# Patient Record
Sex: FEMALE | Race: WHITE
[De-identification: names, ages, dates, MRNs, and addresses within clinical notes are randomized per-mention and may not be internally consistent; named-entity substitution may affect disease eponyms.]

---

## 2018-09-27 ENCOUNTER — HOSPITAL ENCOUNTER (OUTPATIENT)
Dept: HOSPITAL 47 - LABWHC1 | Age: 83
Discharge: HOME | End: 2018-09-27
Payer: MEDICARE

## 2018-09-27 DIAGNOSIS — E78.2: Primary | ICD-10-CM

## 2018-09-27 LAB
ALBUMIN SERPL-MCNC: 4 G/DL (ref 3.5–5)
ALP SERPL-CCNC: 87 U/L (ref 38–126)
ALT SERPL-CCNC: 46 U/L (ref 9–52)
ANION GAP SERPL CALC-SCNC: 6 MMOL/L
AST SERPL-CCNC: 49 U/L (ref 14–36)
BUN SERPL-SCNC: 11 MG/DL (ref 7–17)
CALCIUM SPEC-MCNC: 9.2 MG/DL (ref 8.4–10.2)
CHLORIDE SERPL-SCNC: 94 MMOL/L (ref 98–107)
CHOLEST SERPL-MCNC: 197 MG/DL (ref ?–200)
CO2 SERPL-SCNC: 34 MMOL/L (ref 22–30)
GLUCOSE SERPL-MCNC: 152 MG/DL (ref 74–99)
HDLC SERPL-MCNC: 83 MG/DL (ref 40–60)
LDLC SERPL CALC-MCNC: 99 MG/DL (ref 0–99)
POTASSIUM SERPL-SCNC: 4.6 MMOL/L (ref 3.5–5.1)
PROT SERPL-MCNC: 7.3 G/DL (ref 6.3–8.2)
SODIUM SERPL-SCNC: 134 MMOL/L (ref 137–145)
TRIGL SERPL-MCNC: 75 MG/DL (ref ?–150)

## 2018-09-27 PROCEDURE — 80053 COMPREHEN METABOLIC PANEL: CPT

## 2018-09-27 PROCEDURE — 36415 COLL VENOUS BLD VENIPUNCTURE: CPT

## 2018-09-27 PROCEDURE — 80061 LIPID PANEL: CPT

## 2019-06-10 ENCOUNTER — HOSPITAL ENCOUNTER (OUTPATIENT)
Dept: HOSPITAL 47 - LABWHC1 | Age: 84
Discharge: HOME | End: 2019-06-10
Attending: INTERNAL MEDICINE
Payer: MEDICARE

## 2019-06-10 DIAGNOSIS — E78.2: Primary | ICD-10-CM

## 2019-06-10 LAB
ALBUMIN SERPL-MCNC: 4 G/DL (ref 3.8–4.9)
ALBUMIN/GLOB SERPL: 1.74 G/DL (ref 1.6–3.17)
ALP SERPL-CCNC: 76 U/L (ref 41–126)
ALT SERPL-CCNC: 34 U/L (ref 8–44)
ANION GAP SERPL CALC-SCNC: 5.5 MMOL/L (ref 4–12)
AST SERPL-CCNC: 37 U/L (ref 13–35)
BUN SERPL-SCNC: 15 MG/DL (ref 9–27)
BUN/CREAT SERPL: 18.75 RATIO (ref 12–20)
CALCIUM SPEC-MCNC: 9.3 MG/DL (ref 8.7–10.3)
CHLORIDE SERPL-SCNC: 97 MMOL/L (ref 96–109)
CHOLEST SERPL-MCNC: 159 MG/DL (ref 0–200)
CO2 SERPL-SCNC: 31.5 MMOL/L (ref 21.6–31.8)
GLOBULIN SER CALC-MCNC: 2.3 G/DL (ref 1.6–3.3)
GLUCOSE SERPL-MCNC: 130 MG/DL (ref 70–110)
HDLC SERPL-MCNC: 69 MG/DL (ref 40–60)
LDLC SERPL CALC-MCNC: 73.6 MG/DL (ref 0–131)
POTASSIUM SERPL-SCNC: 4.5 MMOL/L (ref 3.5–5.5)
PROT SERPL-MCNC: 6.3 G/DL (ref 6.2–8.2)
SODIUM SERPL-SCNC: 134 MMOL/L (ref 135–145)
TRIGL SERPL-MCNC: 82 MG/DL (ref 0–149)
VLDLC SERPL CALC-MCNC: 16.4 MG/DL (ref 5–40)

## 2019-06-10 PROCEDURE — 80061 LIPID PANEL: CPT

## 2019-06-10 PROCEDURE — 80053 COMPREHEN METABOLIC PANEL: CPT

## 2019-06-10 PROCEDURE — 36415 COLL VENOUS BLD VENIPUNCTURE: CPT

## 2019-11-07 ENCOUNTER — HOSPITAL ENCOUNTER (EMERGENCY)
Dept: HOSPITAL 47 - EC | Age: 84
Discharge: HOME | End: 2019-11-07
Payer: MEDICARE

## 2019-11-07 VITALS
SYSTOLIC BLOOD PRESSURE: 140 MMHG | RESPIRATION RATE: 19 BRPM | TEMPERATURE: 98.8 F | HEART RATE: 59 BPM | DIASTOLIC BLOOD PRESSURE: 78 MMHG
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
## 2019-11-07 DIAGNOSIS — Z04.3: Primary | ICD-10-CM
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
## 2019-11-07 PROCEDURE — 99284 EMERGENCY DEPT VISIT MOD MDM: CPT

## 2019-11-07 NOTE — ED
General Adult HPI





- General


Chief complaint: Fall


Stated complaint: Fall


Time Seen by Provider: 11/07/19 14:50


Source: patient, EMS, RN notes reviewed, old records reviewed


Mode of arrival: EMS


Limitations: no limitations





- History of Present Illness


Initial comments: 





This is an 87-year-old female who comes into the emergency department stating 

she fell because she was walking up until she states she fell backwards on the 

left side and landed on her buttocks.  She denies hitting her head she denies 

any loss of consciousness or being days.  She denies any neck pain.  Patient 

states she does not believe she injured anything from the fall.  Patient denies 

any numbness weakness.  Patient denies any chest pain or back pain.  Patient 

denies any upper extremity pain.  Patient denies hip pain.  Patient denies any 

lower extremity pain.  Patient was able to ambulate in the emergency department 

all problems. 





- Related Data


                                    Allergies











Allergy/AdvReac Type Severity Reaction Status Date / Time


 


No Known Allergies Allergy   Verified 11/07/19 14:54














Review of Systems


ROS Statement: 


Those systems with pertinent positive or pertinent negative responses have been 

documented in the HPI.





ROS Other: All systems not noted in ROS Statement are negative.





Past Medical History


Additional Past Medical History / Comment(s): Rapid heart beat


History of Any Multi-Drug Resistant Organisms: None Reported


Past Surgical History: No Surgical Hx Reported


Past Psychological History: No Psychological Hx Reported


Smoking Status: Never smoker


Past Alcohol Use History: None Reported


Past Drug Use History: None Reported





General Exam





- General Exam Comments


Initial Comments: 





GENERAL:


Patient is well-developed and well-nourished.  Patient is nontoxic and well-

hydrated and is in no acute distress.





ENT:


Neck is soft and supple.  No significant lymphadenopathy is noted.  Oropharynx 

is clear.  Moist mucous membranes.  Neck has full range of motion without 

eliciting any pain. 





EYES:


The sclera were anicteric and conjunctiva were pink and moist.  Extraocular 

movements were intact and pupils were equal round and reactive to light.  

Eyelids were unremarkable.





PULMONARY:


Unlabored respirations.  Good breath sounds bilaterally.  No audible rales 

rhonchi or wheezing was noted.





CARDIOVASCULAR:


There is a regular rate and rhythm without any murmurs gallops or rubs.  





ABDOMEN:


Soft and nontender with normal bowel sounds. 





SKIN:


Skin is clear with no lesions or rashes and otherwise unremarkable.





NEUROLOGIC:


Patient is alert and oriented x3.  Cranial nerves II through XII are grossly 

intact.  Motor and sensory are also intact.  Normal speech, volume and content. 

Symmetrical smile. 





MUSCULOSKELETAL:


Normal extremities with adequate strength and full range of motion.  No lower 

extremity swelling or edema.  No calf tenderness.





LYMPHATICS:


No significant lymphadenopathy is noted





PSYCHIATRIC:


Normal psychiatric evaluation.  


Limitations: no limitations





Course





                                   Vital Signs











  11/07/19





  14:48


 


Temperature 98.8 F


 


Pulse Rate 59 L


 


Respiratory 19





Rate 


 


Blood Pressure 140/78


 


O2 Sat by Pulse 97





Oximetry 














Medical Decision Making





- Medical Decision Making





Patient ambulated around the ER without problems patient denied any pain or 

problems from the fall.  Patient denies any symptoms prior to the fall.  Patient

would like to go home.  I will be discharging the patient home.





Disposition


Clinical Impression: 


 Fall





Disposition: HOME SELF-CARE


Condition: Good


Instructions (If sedation given, give patient instructions):  Fall Prevention 

for Older Adults (ED)


Is patient prescribed a controlled substance at d/c from ED?: No


Referrals: 


Roosevelt Leos DO [Primary Care Provider] - 1-2 days


Time of Disposition: 15:21

## 2021-04-20 ENCOUNTER — HOSPITAL ENCOUNTER (EMERGENCY)
Dept: HOSPITAL 47 - EC | Age: 86
Discharge: HOME | End: 2021-04-20
Payer: MEDICARE

## 2021-04-20 VITALS
DIASTOLIC BLOOD PRESSURE: 88 MMHG | TEMPERATURE: 97.9 F | RESPIRATION RATE: 20 BRPM | HEART RATE: 77 BPM | SYSTOLIC BLOOD PRESSURE: 160 MMHG
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
## 2021-04-20 DIAGNOSIS — S60.511A: ICD-10-CM

## 2021-04-20 DIAGNOSIS — Z79.899: ICD-10-CM

## 2021-04-20 DIAGNOSIS — E11.9: ICD-10-CM

## 2021-04-20 DIAGNOSIS — Z79.84: ICD-10-CM
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
## 2021-04-20 DIAGNOSIS — S60.512A: ICD-10-CM

## 2021-04-20 DIAGNOSIS — E78.5: ICD-10-CM

## 2021-04-20 DIAGNOSIS — S01.511A: Primary | ICD-10-CM

## 2021-04-20 DIAGNOSIS — I10: ICD-10-CM

## 2021-04-20 DIAGNOSIS — I48.91: ICD-10-CM

## 2021-04-20 DIAGNOSIS — Z79.82: ICD-10-CM

## 2021-04-20 PROCEDURE — 70486 CT MAXILLOFACIAL W/O DYE: CPT

## 2021-04-20 PROCEDURE — 72125 CT NECK SPINE W/O DYE: CPT

## 2021-04-20 PROCEDURE — 70450 CT HEAD/BRAIN W/O DYE: CPT

## 2021-04-20 PROCEDURE — 90471 IMMUNIZATION ADMIN: CPT

## 2021-04-20 PROCEDURE — 90715 TDAP VACCINE 7 YRS/> IM: CPT

## 2021-04-20 PROCEDURE — 99283 EMERGENCY DEPT VISIT LOW MDM: CPT

## 2021-04-20 NOTE — ED
General Adult HPI





- General


Chief complaint: Fall


Stated complaint: fall, facial injury


Time Seen by Provider: 04/20/21 12:32


Source: patient


Mode of arrival: wheelchair


Limitations: no limitations





- History of Present Illness


Initial comments: 





Talisha is an 88-year-old female with a past medical history of atrial 

fibrillation on aspirin, diabetes mellitus, hyperlipidemia, hypertension who 

presents to the emergency room for a chief complaint of fall.  Patient states 

that she was walking across a uneven sidewalk after getting her hair done.  She 

states that she tripped and fell.  She hit her face but she did not lose 

consciousness.  She did not have any severe neck pain. she does not take blood 

thinners. Patient has no other complaints at this time including shortness of 

breath, chest pain, abdominal pain, nausea or vomiting, headache, or visual 

changes.





- Related Data


                                Home Medications











 Medication  Instructions  Recorded  Confirmed


 


Aspirin EC [Ecotrin Low Dose] 81 mg PO DAILY 04/20/21 04/20/21


 


Cholecalciferol [Vitamin D3 (25 25 mcg PO DAILY 04/20/21 04/20/21





Mcg = 1000 Iu)]   


 


Cinnamon Bark [Cinnamon] 500 mg PO DAILY 04/20/21 04/20/21


 


Losartan [Cozaar] 25 mg PO DAILY 04/20/21 04/20/21


 


Multivitamins, Thera [Multivitamin 1 tab PO DAILY 04/20/21 04/20/21





(formulary)]   


 


Pravastatin Sodium [Pravachol] 10 mg PO HS 04/20/21 04/20/21


 


Sotalol [Betapace] 160 mg PO DAILY 04/20/21 04/20/21


 


aMILoride-HCTZ 5-50 mg [Moduretic 1 tab PO DAILY 04/20/21 04/20/21





5-50]   


 


metFORMIN HCL [Glucophage] 500 mg PO W/SUPPER 04/20/21 04/20/21











                                    Allergies











Allergy/AdvReac Type Severity Reaction Status Date / Time


 


No Known Allergies Allergy   Verified 04/20/21 13:04














Review of Systems


ROS Statement: 


Those systems with pertinent positive or pertinent negative responses have been 

documented in the HPI.





ROS Other: All systems not noted in ROS Statement are negative.





Past Medical History


Past Medical History: Atrial Fibrillation, Diabetes Mellitus, Hyperlipidemia, H

ypertension


Additional Past Medical History / Comment(s): Rapid heart beat


History of Any Multi-Drug Resistant Organisms: None Reported


Past Surgical History: No Surgical Hx Reported


Past Psychological History: No Psychological Hx Reported


Smoking Status: Never smoker


Past Alcohol Use History: None Reported


Past Drug Use History: None Reported





General Exam


Limitations: no limitations


General appearance: alert, in no apparent distress


Head exam: Present: normocephalic, normal inspection.  Absent: atraumatic 

(abrasions noted to face. )


Eye exam: Present: normal appearance, PERRL, EOMI.  Absent: scleral icterus, 

conjunctival injection, periorbital swelling


ENT exam: Present: normal exam, mucous membranes moist.  Absent: normal 

oropharynx (lip laceration to the upper lip. Does not involve vermillion border.

 > 1 cm, non gaping)


Neck exam: Present: normal inspection, full ROM.  Absent: tenderness, 

meningismus, lymphadenopathy


Respiratory exam: Present: normal lung sounds bilaterally.  Absent: respiratory 

distress, wheezes, rales, rhonchi, stridor


Cardiovascular Exam: Present: regular rate, normal rhythm, normal heart sounds. 

 Absent: systolic murmur, diastolic murmur, rubs, gallop, clicks


GI/Abdominal exam: Present: soft, normal bowel sounds.  Absent: distended, 

tenderness, guarding, rebound, rigid


Extremities exam: Present: full ROM (full range of motion of all extremities 

without tenderness)


Back exam: Absent: vertebral tenderness





Course





                                   Vital Signs











  04/20/21





  12:07


 


Temperature 97.9 F


 


Pulse Rate 77


 


Respiratory 20





Rate 


 


Blood Pressure 160/88


 


O2 Sat by Pulse 98





Oximetry 














Medical Decision Making





- Medical Decision Making





vitals are stable.  Patient is well-appearing however does have multiple 

abrasions noted to the face as well as small lip lac not requiring sutures.  No 

injuries to the extremities aside from minor scraping on the dorsal aspect of 

the hands.  No neck or back pain. CT of the cervical spine shows no acute 

fracture or dislocation.  CT brain shows no acute intracranial hemorrhage or 

midline shift.  Facial CT shows no acute displaced bone fracture.at this time 

patient can be discharged home to follow up with primary care.  She will return 

here for any worsening symptoms.  In the meantime she will monitor for any signs

 of infection.





I discussed this case with attending Dr. Carr who agrees with this assessment 

and treatment plan.





Disposition


Clinical Impression: 


 Fall, Lip laceration, Abrasion





Disposition: HOME SELF-CARE


Condition: Good


Instructions (If sedation given, give patient instructions):  Laceration (ED), 

Abrasion (ED)


Additional Instructions: 


Keep wounds clean with gentle soap and water. please follow up with primary care

 for a recheck.  Return to the emergency room for any worsening symptoms. 


Is patient prescribed a controlled substance at d/c from ED?: No


Referrals: 


Roosevelt Leos DO [Primary Care Provider] - 1-2 days


Time of Disposition: 14:08

## 2021-04-20 NOTE — CT
EXAMINATION TYPE: CT brain cspine wo con, CT facial bones wo con

 

DATE OF EXAM: 4/20/2021

 

COMPARISON: NONE

 

HISTORY: Fall injury with headache, neck pain, and facial pain.

 

CT DLP: 1038.1 (accession [id number]), Included in brain/cspine (accession [id number]) mGm. Automated E
xposure Control for Dose Reduction was Utilized.

 

 

TECHNIQUE: CT scan of the head, facial bones, and cervical spine are performed without contrast.

 

FINDINGS:   There is no acute intracranial hemorrhage or midline shift identified. Mild to moderate d
iffuse ventricular and sulcal prominence. Moderate-to-severe low attenuation in the deep and perivent
ricular white matter. Calvarium is intact.

 

The mandible is intact. Temporomandibular joints are maintained bilaterally. Nasal bones are intact. 
Orbital floors and walls are intact. The globes are intact bilaterally. The zygomatic arches are inta
ct. The mandible is intact. Pterygoid plates are intact. Paranasal sinuses are grossly clear.

 

Cervical spine is visualized in its entirety from C1 through upper thoracic levels and demonstrates s
atisfactory alignment without evidence of acute fracture or dislocation.  Prevertebral soft tissue ap
pears within normal limits.  The C1-C2 articulation is within normal limits on the coronal images. Ve
rtebral body heights and disc space heights are maintained. Mild to moderate disc space narrowing wit
h moderate to severe spurring C5-C6 and C6-C7 levels. Posterior spur disc complexes efface the anteri
or thecal sac at these levels. Smaller posterior disc herniations efface the anterior thecal sac at C
3-C4 and C4-C5 levels. Axial images show multilevel uncovertebral facet degenerative changes contribu
ting to multilevel neural foraminal narrowing. Reference left C3-C4 and right C4-C5 levels. Somewhat 
small thyroid gland noted.

 

IMPRESSION:

1. There is no acute fracture or dislocation evident in the cervical spine.

2. No acute intracranial hemorrhage or midline shift is seen. 

3. No acute displaced facial bone fracture.

## 2022-01-12 ENCOUNTER — HOSPITAL ENCOUNTER (OUTPATIENT)
Dept: HOSPITAL 47 - RADXRMAIN | Age: 87
Discharge: HOME | End: 2022-01-12
Attending: FAMILY MEDICINE
Payer: MEDICARE

## 2022-01-12 DIAGNOSIS — M25.752: ICD-10-CM

## 2022-01-12 DIAGNOSIS — M25.751: ICD-10-CM

## 2022-01-12 DIAGNOSIS — M16.0: Primary | ICD-10-CM

## 2022-01-12 PROCEDURE — 73521 X-RAY EXAM HIPS BI 2 VIEWS: CPT

## 2022-01-13 NOTE — XR
EXAMINATION TYPE: XR Hip Bilateral Complete

 

DATE OF EXAM: 1/12/2022

 

COMPARISON: NONE

 

HISTORY: 89-year-old female M25.551, M25.552

 

TECHNIQUE: 2 views each side

 

FINDINGS: 

Enthesopathy at the anterior superior iliac spine and ischial tuberosity and to a lesser extent at th
e greater trochanters. Findings may be seen in the setting of dish. There is some marginal degenerati
ve spurring of both hips but with relative preservation of hip joint space. No acute fracture, sublux
ation, or dislocation seen.

 

 

 

IMPRESSION: 

Mild degenerative spurring at both hips. No acute osseous abnormality seen.

## 2022-01-31 ENCOUNTER — HOSPITAL ENCOUNTER (EMERGENCY)
Dept: HOSPITAL 47 - EC | Age: 87
Discharge: HOME | End: 2022-01-31
Payer: MEDICARE

## 2022-01-31 VITALS — SYSTOLIC BLOOD PRESSURE: 155 MMHG | DIASTOLIC BLOOD PRESSURE: 73 MMHG | HEART RATE: 78 BPM | RESPIRATION RATE: 18 BRPM

## 2022-01-31 VITALS — TEMPERATURE: 97.8 F

## 2022-01-31 DIAGNOSIS — Z79.84: ICD-10-CM

## 2022-01-31 DIAGNOSIS — Z79.82: ICD-10-CM

## 2022-01-31 DIAGNOSIS — Z79.899: ICD-10-CM
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
## 2022-01-31 DIAGNOSIS — S01.112A: Primary | ICD-10-CM

## 2022-01-31 DIAGNOSIS — S41.112A: ICD-10-CM

## 2022-01-31 DIAGNOSIS — E11.9: ICD-10-CM

## 2022-01-31 DIAGNOSIS — E78.5: ICD-10-CM

## 2022-01-31 DIAGNOSIS — I10: ICD-10-CM

## 2022-01-31 DIAGNOSIS — I48.91: ICD-10-CM

## 2022-01-31 PROCEDURE — 90715 TDAP VACCINE 7 YRS/> IM: CPT

## 2022-01-31 PROCEDURE — 90471 IMMUNIZATION ADMIN: CPT

## 2022-01-31 PROCEDURE — 99284 EMERGENCY DEPT VISIT MOD MDM: CPT

## 2022-01-31 PROCEDURE — 70450 CT HEAD/BRAIN W/O DYE: CPT

## 2022-01-31 PROCEDURE — 12013 RPR F/E/E/N/L/M 2.6-5.0 CM: CPT

## 2022-01-31 PROCEDURE — 72125 CT NECK SPINE W/O DYE: CPT

## 2022-01-31 NOTE — CT
EXAMINATION TYPE: CT brain cspine wo con

 

DATE OF EXAM: 1/31/2022

 

COMPARISON: Trauma CT April 20, 2021

 

HISTORY: fall injury with headache and neck pain.

 

CT DLP: 1290.9 mGycm. Automated Exposure Control for Dose Reduction was Utilized.

 

 

TECHNIQUE: CT scan of the head and cervical spine are performed without contrast.

 

FINDINGS:   There is no acute intracranial hemorrhage or midline shift identified. Mild ventricular a
nd sulcal prominence redemonstrated. Moderate-to-severe low-attenuation the deep and periventricular 
white matter again seen. The calvarium is intact.  The globes are intact and the visualized sinuses a
re clear.

 

Cervical spine is visualized in its entirety from C1 through upper thoracic levels and demonstrates s
atisfactory alignment without evidence of acute fracture or dislocation.  Prevertebral soft tissue ap
pears within normal limits.  The C1-C2 articulation is within normal limits on the coronal images.  V
ertebral body heights are maintained. There is moderate disc space narrowing with severe anterior spu
rring C5-C6 and C6-C7 levels redemonstrated. Posterior spur disc complexes efface the anterior thecal
 sac at these levels. Review of axial images redemonstrates some multilevel uncovertebral facet degen
erative changes causing multilevel bilateral neural foraminal narrowing for reference left C3-C4 and 
right C4-C5 levels. Thyroid gland is normal in size. Lung apices show no pneumothorax.

 

IMPRESSION:

1. There is no acute fracture or dislocation evident in the cervical spine.

2. No acute intracranial hemorrhage or midline shift is seen.

 

No significant change from prior.

## 2022-01-31 NOTE — ED
Fall HPI





- General


Chief Complaint: Fall


Stated Complaint: Fall


Time Seen by Provider: 01/31/22 06:32


Source: patient


Mode of arrival: EMS





- History of Present Illness


Initial Comments: 


Patient is an 89-year-old female with past medical history of atrial 

fibrillation on aspirin who presents with a chief complaint of fall.  Patient 

was seen to use the bathroom around 5:30 AM this morning she fell and hit her 

head on the bathroom cabinet onto the floor.  Patient does have a history of 

falls.  She did not lose consciousness.  She currently denies any pain as well 

as chest pain, shortness of breath, headache, and dizziness.  She is not 

up-to-date on tetanus.








- Related Data


                                Home Medications











 Medication  Instructions  Recorded  Confirmed


 


Aspirin EC [Ecotrin Low Dose] 81 mg PO DAILY 04/20/21 04/20/21


 


Cholecalciferol [Vitamin D3 (25 25 mcg PO DAILY 04/20/21 04/20/21





Mcg = 1000 Iu)]   


 


Cinnamon Bark [Cinnamon] 500 mg PO DAILY 04/20/21 04/20/21


 


Losartan [Cozaar] 25 mg PO DAILY 04/20/21 04/20/21


 


Multivitamins, Thera [Multivitamin 1 tab PO DAILY 04/20/21 04/20/21





(formulary)]   


 


Pravastatin Sodium [Pravachol] 10 mg PO HS 04/20/21 04/20/21


 


Sotalol [Betapace] 160 mg PO DAILY 04/20/21 04/20/21


 


aMILoride-HCTZ 5-50 mg [Moduretic 1 tab PO DAILY 04/20/21 04/20/21





5-50]   


 


metFORMIN HCL [Glucophage] 500 mg PO W/SUPPER 04/20/21 04/20/21











                                    Allergies











Allergy/AdvReac Type Severity Reaction Status Date / Time


 


No Known Allergies Allergy   Verified 01/31/22 06:26














Review of Systems


ROS Statement: 


Those systems with pertinent positive or pertinent negative responses have been 

documented in the HPI.





ROS Other: All systems not noted in ROS Statement are negative.





Past Medical History


Past Medical History: Atrial Fibrillation, Diabetes Mellitus, Hyperlipidemia, 

Hypertension


Additional Past Medical History / Comment(s): Rapid heart beat


History of Any Multi-Drug Resistant Organisms: None Reported


Past Surgical History: No Surgical Hx Reported


Past Psychological History: No Psychological Hx Reported


Smoking Status: Never smoker


Past Alcohol Use History: None Reported


Past Drug Use History: None Reported





General Exam


General appearance: alert, in no apparent distress


Head exam: Present: atraumatic, normocephalic, normal inspection


Eye exam: Present: normal appearance, PERRL, EOMI.  Absent: scleral icterus, 

conjunctival injection, periorbital swelling


ENT exam: Present: TM's normal bilaterally, normal external ear exam


Neck exam: Present: lymphadenopathy, other (c-spine collar in place).  Absent: 

tenderness


Respiratory exam: Present: normal lung sounds bilaterally.  Absent: respiratory 

distress, wheezes, rales, rhonchi, stridor


Cardiovascular Exam: Present: regular rate, normal rhythm


GI/Abdominal exam: Present: soft, normal bowel sounds.  Absent: distended, 

tenderness, guarding, rebound, rigid


Extremities exam: Present: normal inspection


Neurological exam: Present: alert, oriented X3, CN II-XII intact


Psychiatric exam: Present: normal affect, normal mood


Skin exam: Present: warm, dry, normal color, other (linear laceration over the 

left eyebrow, arrow-shaped skin tear on left arm ).  Absent: rash





Course


                                   Vital Signs











  01/31/22





  06:26


 


Temperature 97.8 F


 


Pulse Rate 82


 


Respiratory 20





Rate 


 


Blood Pressure 151/76


 


O2 Sat by Pulse 98





Oximetry 














Procedures





- Laceration


  ** Laceration #1


Indication: laceration


Site: face (beneath left eyebrow)


Size (cm): 3


Description: linear


Depth: simple, single layer


Sedation/Analgesia: none


Anesthetic Used: lidocaine 1%


Anesthesia Technique: local infiltration


Pre-repair: wound explored, irrigated extensively, deep structures intact


Type of Sutures: nylon


Size of Sutures: 6-0


Technique: simple, interrupted


Patient Tolerated Procedure: no complications


Additional Comments: 





3 cm arrow shaped skin tear on left arm was cleaned thoroughly and wrapped with 

Kerlix gauze. 





Medical Decision Making





- Medical Decision Making


This is an 89-year-old female with a past medical history of atrial fibrillation

 on aspirin who presents after a fall at 5:30 this morning.  Patient looks well 

and is hemodynamically stable. CT reveals no acute fracture or dislocation 

evident in the cervical spine, no acute intracranial hemorrhage or midline shift

 seen. Laceration beneath the left eyebrow was cleaned thoroughly and 5 sutures 

were placed.  Skin tear on left arm was cleaned thoroughly and wrapped with 

Kerlix gauze.  Patient and daughter instructed to follow up with primary care 

for suture removal in 5 days.  Return parameters discussed.








Disposition


Clinical Impression: 


 Fall





Disposition: HOME SELF-CARE


Instructions (If sedation given, give patient instructions):  Fall Prevention 

for Older Adults (ED)


Additional Instructions: 


Keep wound clean and dry.  Follow-up with primary care provider for suture 

removal in 5 days.  Return to the ER she experience new, concerning, or 

worsening symptoms.


Is patient prescribed a controlled substance at d/c from ED?: No


Referrals: 


Roosevelt Leos DO [Primary Care Provider] - 1-2 days


Time of Disposition: 08:18

## 2022-03-02 ENCOUNTER — HOSPITAL ENCOUNTER (OUTPATIENT)
Dept: HOSPITAL 47 - CATHEP | Age: 87
Discharge: HOME | End: 2022-03-02
Attending: INTERNAL MEDICINE
Payer: MEDICARE

## 2022-03-02 VITALS — SYSTOLIC BLOOD PRESSURE: 150 MMHG | HEART RATE: 95 BPM | DIASTOLIC BLOOD PRESSURE: 66 MMHG

## 2022-03-02 VITALS — TEMPERATURE: 97.7 F

## 2022-03-02 VITALS — RESPIRATION RATE: 16 BRPM

## 2022-03-02 VITALS — BODY MASS INDEX: 26.4 KG/M2

## 2022-03-02 DIAGNOSIS — E11.9: ICD-10-CM

## 2022-03-02 DIAGNOSIS — Z98.890: ICD-10-CM

## 2022-03-02 DIAGNOSIS — Z20.822: ICD-10-CM

## 2022-03-02 DIAGNOSIS — R55: Primary | ICD-10-CM

## 2022-03-02 DIAGNOSIS — Z79.82: ICD-10-CM

## 2022-03-02 DIAGNOSIS — E78.2: ICD-10-CM

## 2022-03-02 DIAGNOSIS — R53.83: ICD-10-CM

## 2022-03-02 DIAGNOSIS — I10: ICD-10-CM

## 2022-03-02 DIAGNOSIS — Z79.899: ICD-10-CM

## 2022-03-02 LAB — GLUCOSE BLD-MCNC: 157 MG/DL (ref 75–99)

## 2022-03-02 PROCEDURE — 87635 SARS-COV-2 COVID-19 AMP PRB: CPT

## 2022-03-02 PROCEDURE — 33285 INSJ SUBQ CAR RHYTHM MNTR: CPT

## 2022-03-02 NOTE — P.PCN
Date of Procedure: 03/02/22


Preoperative Diagnosis: 


Unexplained syncope


Postoperative Diagnosis: 


The same


Procedure(s) Performed: 


Loop recorder insertion


Description of Procedure: 


Patient was brought to the lab in a fasting state.  Patient is prepped and gavi

ped in the usual fashion.  She was given 12.5 g of fentanyl for sedation.  The 

skin in the third intercostal space on the left side was infiltrated with 

lidocaine.  An incision was made in the skin and the loop recorder was inserted 

in the usual fashion.  2 silk stay sutures were applied to the skin.  Patient 

tolerated the procedure well.  Blood loss is less than 5 mL.  No immediate 

complications.  The device is programmed in the usual fashion





Plan: Patient will be discharged home later today.  Patient will keep the 

dressing dry until seen in the office in one week.  May use Tylenol for pain

## 2022-09-11 ENCOUNTER — HOSPITAL ENCOUNTER (EMERGENCY)
Dept: HOSPITAL 47 - EC | Age: 87
Discharge: LEFT BEFORE DISCHARGE | End: 2022-09-11
Payer: MEDICARE

## 2022-09-11 VITALS
HEART RATE: 77 BPM | SYSTOLIC BLOOD PRESSURE: 139 MMHG | TEMPERATURE: 98.2 F | DIASTOLIC BLOOD PRESSURE: 79 MMHG | RESPIRATION RATE: 20 BRPM

## 2022-09-11 DIAGNOSIS — I63.81: Primary | ICD-10-CM

## 2022-09-11 DIAGNOSIS — E78.5: ICD-10-CM

## 2022-09-11 DIAGNOSIS — I10: ICD-10-CM

## 2022-09-11 DIAGNOSIS — E11.9: ICD-10-CM

## 2022-09-11 LAB
ALBUMIN SERPL-MCNC: 4 G/DL (ref 3.5–5)
ALP SERPL-CCNC: 106 U/L (ref 38–126)
ALT SERPL-CCNC: 19 U/L (ref 4–34)
ANION GAP SERPL CALC-SCNC: 12 MMOL/L
APTT BLD: 23.5 SEC (ref 22–30)
AST SERPL-CCNC: 40 U/L (ref 14–36)
BASOPHILS # BLD AUTO: 0 K/UL (ref 0–0.2)
BASOPHILS NFR BLD AUTO: 1 %
BUN SERPL-SCNC: 14 MG/DL (ref 7–17)
CALCIUM SPEC-MCNC: 9.2 MG/DL (ref 8.4–10.2)
CHLORIDE SERPL-SCNC: 95 MMOL/L (ref 98–107)
CO2 SERPL-SCNC: 27 MMOL/L (ref 22–30)
EOSINOPHIL # BLD AUTO: 0.1 K/UL (ref 0–0.7)
EOSINOPHIL NFR BLD AUTO: 2 %
ERYTHROCYTE [DISTWIDTH] IN BLOOD BY AUTOMATED COUNT: 4.8 M/UL (ref 3.8–5.4)
ERYTHROCYTE [DISTWIDTH] IN BLOOD: 13.1 % (ref 11.5–15.5)
GLUCOSE SERPL-MCNC: 112 MG/DL (ref 74–99)
HCT VFR BLD AUTO: 46.4 % (ref 34–46)
HGB BLD-MCNC: 15.6 GM/DL (ref 11.4–16)
INR PPP: 1 (ref ?–1.2)
LYMPHOCYTES # SPEC AUTO: 1.1 K/UL (ref 1–4.8)
LYMPHOCYTES NFR SPEC AUTO: 20 %
MCH RBC QN AUTO: 32.4 PG (ref 25–35)
MCHC RBC AUTO-ENTMCNC: 33.5 G/DL (ref 31–37)
MCV RBC AUTO: 96.8 FL (ref 80–100)
MONOCYTES # BLD AUTO: 0.5 K/UL (ref 0–1)
MONOCYTES NFR BLD AUTO: 9 %
NEUTROPHILS # BLD AUTO: 3.5 K/UL (ref 1.3–7.7)
NEUTROPHILS NFR BLD AUTO: 66 %
PLATELET # BLD AUTO: 178 K/UL (ref 150–450)
POTASSIUM SERPL-SCNC: 3.7 MMOL/L (ref 3.5–5.1)
PROT SERPL-MCNC: 7 G/DL (ref 6.3–8.2)
PT BLD: 10.9 SEC (ref 9–12)
SODIUM SERPL-SCNC: 134 MMOL/L (ref 137–145)
WBC # BLD AUTO: 5.3 K/UL (ref 3.8–10.6)

## 2022-09-11 PROCEDURE — 84484 ASSAY OF TROPONIN QUANT: CPT

## 2022-09-11 PROCEDURE — 71046 X-RAY EXAM CHEST 2 VIEWS: CPT

## 2022-09-11 PROCEDURE — 70450 CT HEAD/BRAIN W/O DYE: CPT

## 2022-09-11 PROCEDURE — 80053 COMPREHEN METABOLIC PANEL: CPT

## 2022-09-11 PROCEDURE — 85730 THROMBOPLASTIN TIME PARTIAL: CPT

## 2022-09-11 PROCEDURE — 85610 PROTHROMBIN TIME: CPT

## 2022-09-11 PROCEDURE — 99284 EMERGENCY DEPT VISIT MOD MDM: CPT

## 2022-09-11 PROCEDURE — 93005 ELECTROCARDIOGRAM TRACING: CPT

## 2022-09-11 PROCEDURE — 36415 COLL VENOUS BLD VENIPUNCTURE: CPT

## 2022-09-11 PROCEDURE — 85025 COMPLETE CBC W/AUTO DIFF WBC: CPT

## 2022-09-11 NOTE — XR
EXAMINATION TYPE: XR chest 2V

 

DATE OF EXAM: 9/11/2022

 

COMPARISON: NONE

 

HISTORY: Altered mental status

 

TECHNIQUE: 2 views

 

FINDINGS: There is no heart failure nor confluent pneumonic infiltrate. Costophrenic angles are clear
. There are no hilar masses. There is some spurring in the thoracic spine. There are some coarse lung
 markings left lower lobe.

 

IMPRESSION: There is likely some subsegmental atelectasis or fibrotic changes left lung base. No hear
t failure..

## 2022-09-11 NOTE — CT
EXAMINATION TYPE: CT brain wo con for TPA

 

DATE OF EXAM: 9/11/2022

 

COMPARISON: 1/31/2022

 

HISTORY: weakness

 

CT DLP: 1055.4 mGycm

Automated exposure control for dose reduction was used.

 

There is some cerebral cortical atrophy. There is no mass effect or midline shift. No sign of intracr
anial hemorrhage. There is some hypodensity in the periventricular white matter. There is 8 mm lacuna
r infarct right internal capsule. Calvarium is intact.

 

IMPRESSION:

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 Chronic small vesse
l ischemia.

## 2022-09-11 NOTE — ED
General Adult HPI





- General


Chief complaint: Neuro Symptoms/Deficit


Stated complaint: poss stroke


Time Seen by Provider: 09/11/22 15:09


Source: patient


Mode of arrival: ambulatory


Limitations: no limitations





- History of Present Illness


Initial comments: 





This patient is an 89-year-old woman who presents to have evaluation for 

constellation of symptoms that developed approximately around noon today.  

History is given by both the patient and her daughter who is at the bedside.  

The patient had been sitting when she was noticed by her daughter to slump to 

her right side.  She also had shaking that her daughter described as a very 

intense tremor but involving both arms.  The patient was having a difficult time

with communication she was not able to speak.  She did not lose consciousness, 

and she did not have loss of continence.  The symptoms lasted for a number of 

minutes, and then she began feeling better.  The tremor stopped and she was able

to speak.  There did not appear to be postictal period of confusion.  She was 

able to get up and walk to the bathroom at her home.  They discussed the 

symptoms and felt she should be seen in the emergency department here.  Patient 

states she is feeling back to normal now.


-: hour(s)


Severity scale (1-10): 0


Consistency: now resolved


Worsens with: none


Associated Symptoms: denies other symptoms





- Related Data


                                Home Medications











 Medication  Instructions  Recorded  Confirmed


 


Aspirin EC [Ecotrin Low Dose] 81 mg PO DAILY 04/20/21 09/11/22


 


Cholecalciferol [Vitamin D3 (25 50 mcg PO DAILY 04/20/21 09/11/22





Mcg = 1000 Iu)]   


 


Cinnamon Bark [Cinnamon] 1,000 mg PO HS 04/20/21 09/11/22


 


Multivitamins, Thera [Multivitamin 1 tab PO DAILY 04/20/21 09/11/22





(formulary)]   


 


Pravastatin Sodium [Pravachol] 10 mg PO HS 04/20/21 09/11/22


 


aMILoride-HCTZ 5-50 mg [Moduretic 1 tab PO DAILY 04/20/21 09/11/22





5-50]   


 


Ferrous Sulfate [Feosol] 325 mg PO DAILY 01/31/22 09/11/22


 


Latanoprost Ophth [Xalatan 0.005%] 1 drop BOTH EYES HS 03/01/22 09/11/22


 


Metoprolol Tartrate [Lopressor] 25 mg PO DAILY 09/11/22 09/11/22











                                    Allergies











Allergy/AdvReac Type Severity Reaction Status Date / Time


 


No Known Allergies Allergy   Verified 09/11/22 17:16














Review of Systems


ROS Statement: 


Those systems with pertinent positive or pertinent negative responses have been 

documented in the HPI.





ROS Other: All systems not noted in ROS Statement are negative.


Constitutional: Denies: fever, chills


Eyes: Denies: vision change


Respiratory: Denies: cough, dyspnea


Cardiovascular: Reports: edema.  Denies: chest pain, palpitations, syncope


Gastrointestinal: Denies: abdominal pain, vomiting, diarrhea


Genitourinary: Denies: dysuria, hematuria


Musculoskeletal: Denies: back pain


Skin: Denies: rash


Neurological: Reports: as per HPI, weakness, other.  Denies: headache


Hematological/Lymphatic: Denies: easy bleeding





Past Medical History


Past Medical History: Atrial Fibrillation, Diabetes Mellitus, Hyperlipidemia, 

Hypertension


Additional Past Medical History / Comment(s): See Dr. Cunningham H&P.  No 

medication needed any longer for Diabetes.


History of Any Multi-Drug Resistant Organisms: None Reported


Past Surgical History: Orthopedic Surgery


Additional Past Surgical History / Comment(s): Right knee surgery, 

colonoscopies.


Past Anesthesia/Blood Transfusion Reactions: No Reported Reaction


Past Psychological History: No Psychological Hx Reported


Smoking Status: Never smoker


Past Alcohol Use History: None Reported


Past Drug Use History: None Reported





- Past Family History


  ** Daughter(s)


Family Medical History: Cancer





General Exam


Limitations: no limitations


General appearance: alert, in no apparent distress


Head exam: Present: atraumatic, normocephalic


Eye exam: Present: normal appearance.  Absent: scleral icterus, conjunctival 

injection


ENT exam: Present: normal oropharynx


Neck exam: Present: normal inspection


Respiratory exam: Present: normal lung sounds bilaterally.  Absent: respiratory 

distress, wheezes, rales, rhonchi, stridor


Cardiovascular Exam: Present: regular rate, normal rhythm, normal heart sounds. 

 Absent: systolic murmur, diastolic murmur, rubs, gallop


GI/Abdominal exam: Present: soft.  Absent: distended, tenderness, guarding, 

rebound, rigid, mass


Extremities exam: Present: normal inspection, normal capillary refill.  Absent: 

pedal edema, calf tenderness


Back exam: Present: normal inspection


Neurological exam: Present: alert, oriented X3, CN II-XII intact.  Absent: motor

 sensory deficit


  ** Expanded


Patient oriented to: Present: person, place, time


Speech: Present: fluid speech


Cerebellar function: Finger to Nose: Normal


Sensory exam: Upper Extremity Light Touch: Normal, Lower Extremity Light Touch: 

Normal


Motor strength exam: RUE: 5, LUE: 5, RLE: 5, LLE: 5


Eye Response: (4) open spontaneously


Motor Response: (6) obeys commands


Verbal Response: (5) oriented


Skin exam: Present: warm, dry, intact, normal color.  Absent: rash





Course


                                   Vital Signs











  09/11/22





  14:54


 


Temperature 98.2 F


 


Pulse Rate 77


 


Respiratory 20





Rate 


 


Blood Pressure 139/79


 


O2 Sat by Pulse 97





Oximetry 














EKG Findings





- EKG Results:


EKG: interpreted by ELIANA, sinus rhythm (Rate 77 bpm)





- Blocks, Axis, Hypertrophy, ST Abn:


AV and intraventricular conduction: left bundle branch block 

(fixed/intermittent, complete/incomplete)





Medical Decision Making





- Medical Decision Making





This patient is an 89-year-old woman who had an episode at home which she was 

shaking and is was finding it difficult to speak.  She is back at baseline on 

arrival and she feels well throughout her stay here.  The workup is concerning 

for lacunar infarct of the anterior capsule since her last CT.  In light of this

 I recommended that she be seen by neurology and was going to admit her here but

 patient states that as she is back at baseline she will follow up as an 

outpatient.  We discussed appropriate control of blood pressure, keeping on her 

aspirin and they may switch her to Plavix, and she will follow-up.  They will 

return here should any symptoms recur or new symptoms develop.





- Lab Data


Result diagrams: 


                                 09/11/22 15:59





                                 09/11/22 15:59


                                   Lab Results











  09/11/22 09/11/22 09/11/22 Range/Units





  15:59 15:59 15:59 


 


WBC  5.3    (3.8-10.6)  k/uL


 


RBC  4.80    (3.80-5.40)  m/uL


 


Hgb  15.6    (11.4-16.0)  gm/dL


 


Hct  46.4 H    (34.0-46.0)  %


 


MCV  96.8    (80.0-100.0)  fL


 


MCH  32.4    (25.0-35.0)  pg


 


MCHC  33.5    (31.0-37.0)  g/dL


 


RDW  13.1    (11.5-15.5)  %


 


Plt Count  178    (150-450)  k/uL


 


MPV  8.5    


 


Neutrophils %  66    %


 


Lymphocytes %  20    %


 


Monocytes %  9    %


 


Eosinophils %  2    %


 


Basophils %  1    %


 


Neutrophils #  3.5    (1.3-7.7)  k/uL


 


Lymphocytes #  1.1    (1.0-4.8)  k/uL


 


Monocytes #  0.5    (0-1.0)  k/uL


 


Eosinophils #  0.1    (0-0.7)  k/uL


 


Basophils #  0.0    (0-0.2)  k/uL


 


PT   10.9   (9.0-12.0)  sec


 


INR   1.0   (<1.2)  


 


APTT   23.5   (22.0-30.0)  sec


 


Sodium    134 L  (137-145)  mmol/L


 


Potassium    3.7  (3.5-5.1)  mmol/L


 


Chloride    95 L  ()  mmol/L


 


Carbon Dioxide    27  (22-30)  mmol/L


 


Anion Gap    12  mmol/L


 


BUN    14  (7-17)  mg/dL


 


Creatinine    0.59  (0.52-1.04)  mg/dL


 


Est GFR (CKD-EPI)AfAm    >90  (>60 ml/min/1.73 sqM)  


 


Est GFR (CKD-EPI)NonAf    82  (>60 ml/min/1.73 sqM)  


 


Glucose    112 H  (74-99)  mg/dL


 


Calcium    9.2  (8.4-10.2)  mg/dL


 


Total Bilirubin    0.8  (0.2-1.3)  mg/dL


 


AST    40 H  (14-36)  U/L


 


ALT    19  (4-34)  U/L


 


Alkaline Phosphatase    106  ()  U/L


 


Troponin I     (0.000-0.034)  ng/mL


 


Total Protein    7.0  (6.3-8.2)  g/dL


 


Albumin    4.0  (3.5-5.0)  g/dL














  09/11/22 Range/Units





  15:59 


 


WBC   (3.8-10.6)  k/uL


 


RBC   (3.80-5.40)  m/uL


 


Hgb   (11.4-16.0)  gm/dL


 


Hct   (34.0-46.0)  %


 


MCV   (80.0-100.0)  fL


 


MCH   (25.0-35.0)  pg


 


MCHC   (31.0-37.0)  g/dL


 


RDW   (11.5-15.5)  %


 


Plt Count   (150-450)  k/uL


 


MPV   


 


Neutrophils %   %


 


Lymphocytes %   %


 


Monocytes %   %


 


Eosinophils %   %


 


Basophils %   %


 


Neutrophils #   (1.3-7.7)  k/uL


 


Lymphocytes #   (1.0-4.8)  k/uL


 


Monocytes #   (0-1.0)  k/uL


 


Eosinophils #   (0-0.7)  k/uL


 


Basophils #   (0-0.2)  k/uL


 


PT   (9.0-12.0)  sec


 


INR   (<1.2)  


 


APTT   (22.0-30.0)  sec


 


Sodium   (137-145)  mmol/L


 


Potassium   (3.5-5.1)  mmol/L


 


Chloride   ()  mmol/L


 


Carbon Dioxide   (22-30)  mmol/L


 


Anion Gap   mmol/L


 


BUN   (7-17)  mg/dL


 


Creatinine   (0.52-1.04)  mg/dL


 


Est GFR (CKD-EPI)AfAm   (>60 ml/min/1.73 sqM)  


 


Est GFR (CKD-EPI)NonAf   (>60 ml/min/1.73 sqM)  


 


Glucose   (74-99)  mg/dL


 


Calcium   (8.4-10.2)  mg/dL


 


Total Bilirubin   (0.2-1.3)  mg/dL


 


AST   (14-36)  U/L


 


ALT   (4-34)  U/L


 


Alkaline Phosphatase   ()  U/L


 


Troponin I  <0.012  (0.000-0.034)  ng/mL


 


Total Protein   (6.3-8.2)  g/dL


 


Albumin   (3.5-5.0)  g/dL














Disposition


Clinical Impression: 


 Lacunar infarction





Disposition: Left Against Medical Advice


Condition: Fair


Instructions (If sedation given, give patient instructions):  Stroke (DC)


Is patient prescribed a controlled substance at d/c from ED?: No


Referrals: 


Roosevelt Leos DO [Primary Care Provider] - 1-2 days


Haleigh Wiley MD [STAFF PHYSICIAN] - 1-2 days